# Patient Record
Sex: MALE | Race: OTHER | HISPANIC OR LATINO | URBAN - METROPOLITAN AREA
[De-identification: names, ages, dates, MRNs, and addresses within clinical notes are randomized per-mention and may not be internally consistent; named-entity substitution may affect disease eponyms.]

---

## 2019-07-16 ENCOUNTER — EMERGENCY (EMERGENCY)
Facility: HOSPITAL | Age: 34
LOS: 1 days | Discharge: HOME | End: 2019-07-16
Attending: EMERGENCY MEDICINE
Payer: SELF-PAY

## 2019-07-16 VITALS
HEIGHT: 67 IN | TEMPERATURE: 98 F | RESPIRATION RATE: 20 BRPM | OXYGEN SATURATION: 99 % | DIASTOLIC BLOOD PRESSURE: 79 MMHG | HEART RATE: 78 BPM | SYSTOLIC BLOOD PRESSURE: 121 MMHG | WEIGHT: 169.98 LBS

## 2019-07-16 PROCEDURE — 90715 TDAP VACCINE 7 YRS/> IM: CPT

## 2019-07-16 PROCEDURE — 90471 IMMUNIZATION ADMIN: CPT

## 2019-07-16 PROCEDURE — 99283 EMERGENCY DEPT VISIT LOW MDM: CPT

## 2019-07-16 PROCEDURE — 99283 EMERGENCY DEPT VISIT LOW MDM: CPT | Mod: 25

## 2019-07-16 RX ORDER — TETANUS TOXOID, REDUCED DIPHTHERIA TOXOID AND ACELLULAR PERTUSSIS VACCINE, ADSORBED 5; 2.5; 8; 8; 2.5 [IU]/.5ML; [IU]/.5ML; UG/.5ML; UG/.5ML; UG/.5ML
0.5 SUSPENSION INTRAMUSCULAR ONCE
Refills: 0 | Status: COMPLETED | OUTPATIENT
Start: 2019-07-16 | End: 2019-07-16

## 2019-07-16 RX ADMIN — Medication 300 MILLIGRAM(S): at 12:58

## 2019-07-16 RX ADMIN — TETANUS TOXOID, REDUCED DIPHTHERIA TOXOID AND ACELLULAR PERTUSSIS VACCINE, ADSORBED 0.5 MILLILITER(S): 5; 2.5; 8; 8; 2.5 SUSPENSION INTRAMUSCULAR at 12:58

## 2019-07-16 NOTE — ED PROVIDER NOTE - PHYSICAL EXAMINATION
Diffuse swelling of dorsum with slight limitation of motion. No area of Cellulitis. Puncture wound noted to the dorsum of hand with 2 mm area of necrosis.

## 2019-07-16 NOTE — ED PROVIDER NOTE - OBJECTIVE STATEMENT
34 y/o pt with no significant PMHx and no significant PSHx c/o bite over dorsum of the LT hand close to the ulnar aspect x 4 days ago. Bite most likely by a mosquito. Swelling and erythema of LT hand developing. Pt relates being in some pain. Pt also states he had a slight fever yesterday, although no recording of temperature done. Pt  denies any other acute complaints. NKDA.

## 2022-10-11 NOTE — ED ADULT NURSE NOTE - NSFALLRSKASSESASSIST_ED_ALL_ED
Having CP when she takes a Deep Breath she got DX of costochondritis she does not feel it is getting better as fast as it should. I explained to her that this can take weeks to get better due to the limited blood flow to the rib areas. She was afraid to take ibuprofen too long. I discussed taking aleive  Every 12 hours and applying heat every 2 hours during rest times she already has an appointment for 2 weeks from now.
Pt had an appt last week with Adela Ellis PA-C  And she said she had chest pain when taking a deep breath she is still having the same issue.
no